# Patient Record
Sex: MALE | Race: WHITE | Employment: FULL TIME | URBAN - METROPOLITAN AREA
[De-identification: names, ages, dates, MRNs, and addresses within clinical notes are randomized per-mention and may not be internally consistent; named-entity substitution may affect disease eponyms.]

---

## 2024-08-16 ENCOUNTER — APPOINTMENT (OUTPATIENT)
Dept: CT IMAGING | Age: 34
End: 2024-08-16
Payer: COMMERCIAL

## 2024-08-16 ENCOUNTER — HOSPITAL ENCOUNTER (EMERGENCY)
Age: 34
Discharge: HOME | End: 2024-08-16
Attending: STUDENT IN AN ORGANIZED HEALTH CARE EDUCATION/TRAINING PROGRAM
Payer: COMMERCIAL

## 2024-08-16 VITALS
TEMPERATURE: 98.5 F | WEIGHT: 215.39 LBS | RESPIRATION RATE: 18 BRPM | HEART RATE: 60 BPM | DIASTOLIC BLOOD PRESSURE: 79 MMHG | SYSTOLIC BLOOD PRESSURE: 133 MMHG | OXYGEN SATURATION: 95 %

## 2024-08-16 DIAGNOSIS — L03.213 PERIORBITAL CELLULITIS OF LEFT EYE: Primary | ICD-10-CM

## 2024-08-16 LAB
ALBUMIN SERPL-MCNC: 4.2 G/DL (ref 3.4–5)
ANION GAP SERPL CALCULATED.3IONS-SCNC: 13 MMOL/L (ref 3–16)
BASOPHILS # BLD: 0.1 K/UL (ref 0–0.2)
BASOPHILS NFR BLD: 0.6 %
BUN SERPL-MCNC: 4 MG/DL (ref 7–20)
CALCIUM SERPL-MCNC: 9.2 MG/DL (ref 8.3–10.6)
CHLORIDE SERPL-SCNC: 99 MMOL/L (ref 99–110)
CO2 SERPL-SCNC: 25 MMOL/L (ref 21–32)
CREAT SERPL-MCNC: 0.7 MG/DL (ref 0.9–1.3)
DEPRECATED RDW RBC AUTO: 14.9 % (ref 12.4–15.4)
EOSINOPHIL # BLD: 0.1 K/UL (ref 0–0.6)
EOSINOPHIL NFR BLD: 0.9 %
GFR SERPLBLD CREATININE-BSD FMLA CKD-EPI: >90 ML/MIN/{1.73_M2}
GLUCOSE SERPL-MCNC: 118 MG/DL (ref 70–99)
HCT VFR BLD AUTO: 40.7 % (ref 40.5–52.5)
HGB BLD-MCNC: 14 G/DL (ref 13.5–17.5)
LYMPHOCYTES # BLD: 4 K/UL (ref 1–5.1)
LYMPHOCYTES NFR BLD: 41.4 %
MCH RBC QN AUTO: 31.8 PG (ref 26–34)
MCHC RBC AUTO-ENTMCNC: 34.5 G/DL (ref 31–36)
MCV RBC AUTO: 92.3 FL (ref 80–100)
MONOCYTES # BLD: 0.5 K/UL (ref 0–1.3)
MONOCYTES NFR BLD: 5.7 %
NEUTROPHILS # BLD: 5 K/UL (ref 1.7–7.7)
NEUTROPHILS NFR BLD: 51.4 %
PHOSPHATE SERPL-MCNC: 3.2 MG/DL (ref 2.5–4.9)
PLATELET # BLD AUTO: 205 K/UL (ref 135–450)
PMV BLD AUTO: 9.1 FL (ref 5–10.5)
POTASSIUM SERPL-SCNC: 3.4 MMOL/L (ref 3.5–5.1)
RBC # BLD AUTO: 4.41 M/UL (ref 4.2–5.9)
SODIUM SERPL-SCNC: 137 MMOL/L (ref 136–145)
WBC # BLD AUTO: 9.7 K/UL (ref 4–11)

## 2024-08-16 PROCEDURE — 6360000002 HC RX W HCPCS: Performed by: STUDENT IN AN ORGANIZED HEALTH CARE EDUCATION/TRAINING PROGRAM

## 2024-08-16 PROCEDURE — 6360000004 HC RX CONTRAST MEDICATION: Performed by: STUDENT IN AN ORGANIZED HEALTH CARE EDUCATION/TRAINING PROGRAM

## 2024-08-16 PROCEDURE — 2580000003 HC RX 258: Performed by: STUDENT IN AN ORGANIZED HEALTH CARE EDUCATION/TRAINING PROGRAM

## 2024-08-16 PROCEDURE — 96366 THER/PROPH/DIAG IV INF ADDON: CPT

## 2024-08-16 PROCEDURE — 85025 COMPLETE CBC W/AUTO DIFF WBC: CPT

## 2024-08-16 PROCEDURE — 96365 THER/PROPH/DIAG IV INF INIT: CPT

## 2024-08-16 PROCEDURE — 80069 RENAL FUNCTION PANEL: CPT

## 2024-08-16 PROCEDURE — 70481 CT ORBIT/EAR/FOSSA W/DYE: CPT

## 2024-08-16 PROCEDURE — 99285 EMERGENCY DEPT VISIT HI MDM: CPT

## 2024-08-16 PROCEDURE — 6370000000 HC RX 637 (ALT 250 FOR IP): Performed by: STUDENT IN AN ORGANIZED HEALTH CARE EDUCATION/TRAINING PROGRAM

## 2024-08-16 RX ORDER — IBUPROFEN 600 MG/1
600 TABLET ORAL EVERY 6 HOURS PRN
Qty: 30 TABLET | Refills: 0 | Status: SHIPPED | OUTPATIENT
Start: 2024-08-16 | End: 2024-08-16

## 2024-08-16 RX ORDER — OXYCODONE HYDROCHLORIDE 5 MG/1
5 TABLET ORAL ONCE
Status: COMPLETED | OUTPATIENT
Start: 2024-08-16 | End: 2024-08-16

## 2024-08-16 RX ORDER — TETRACAINE HYDROCHLORIDE 5 MG/ML
1 SOLUTION OPHTHALMIC ONCE
Status: COMPLETED | OUTPATIENT
Start: 2024-08-16 | End: 2024-08-16

## 2024-08-16 RX ORDER — AMOXICILLIN AND CLAVULANATE POTASSIUM 875; 125 MG/1; MG/1
1 TABLET, FILM COATED ORAL 2 TIMES DAILY
Qty: 14 TABLET | Refills: 0 | Status: SHIPPED | OUTPATIENT
Start: 2024-08-16 | End: 2024-08-16

## 2024-08-16 RX ORDER — IBUPROFEN 600 MG/1
600 TABLET ORAL EVERY 6 HOURS PRN
Qty: 30 TABLET | Refills: 0 | Status: SHIPPED | OUTPATIENT
Start: 2024-08-16

## 2024-08-16 RX ORDER — POLYVINYL ALCOHOL 14 MG/ML
1 SOLUTION/ DROPS OPHTHALMIC PRN
Qty: 15 ML | Refills: 4 | Status: SHIPPED | OUTPATIENT
Start: 2024-08-16 | End: 2024-09-15

## 2024-08-16 RX ORDER — AMOXICILLIN AND CLAVULANATE POTASSIUM 875; 125 MG/1; MG/1
1 TABLET, FILM COATED ORAL 2 TIMES DAILY
Qty: 14 TABLET | Refills: 0 | Status: SHIPPED | OUTPATIENT
Start: 2024-08-16 | End: 2024-08-23

## 2024-08-16 RX ADMIN — OXYCODONE HYDROCHLORIDE 5 MG: 5 TABLET ORAL at 15:05

## 2024-08-16 RX ADMIN — AMPICILLIN AND SULBACTAM 3000 MG: 2; 1 INJECTION, POWDER, FOR SOLUTION INTRAVENOUS at 14:43

## 2024-08-16 RX ADMIN — IOPAMIDOL 75 ML: 755 INJECTION, SOLUTION INTRAVENOUS at 16:24

## 2024-08-16 RX ADMIN — TETRACAINE HYDROCHLORIDE 1 DROP: 5 SOLUTION OPHTHALMIC at 14:43

## 2024-08-16 RX ADMIN — FLUORESCEIN SODIUM 1 MG: 1 STRIP OPHTHALMIC at 14:45

## 2024-08-16 ASSESSMENT — PAIN DESCRIPTION - ONSET: ONSET: GRADUAL

## 2024-08-16 ASSESSMENT — VISUAL ACUITY
OS: 20/100
OU: 20/50
OD: 20/50

## 2024-08-16 ASSESSMENT — PAIN SCALES - GENERAL
PAINLEVEL_OUTOF10: 9
PAINLEVEL_OUTOF10: 7

## 2024-08-16 ASSESSMENT — PAIN DESCRIPTION - DESCRIPTORS: DESCRIPTORS: SHARP

## 2024-08-16 ASSESSMENT — PAIN DESCRIPTION - ORIENTATION
ORIENTATION: LEFT
ORIENTATION: LEFT

## 2024-08-16 ASSESSMENT — PAIN - FUNCTIONAL ASSESSMENT
PAIN_FUNCTIONAL_ASSESSMENT: 0-10
PAIN_FUNCTIONAL_ASSESSMENT: PREVENTS OR INTERFERES WITH ALL ACTIVE AND SOME PASSIVE ACTIVITIES

## 2024-08-16 ASSESSMENT — PAIN DESCRIPTION - FREQUENCY: FREQUENCY: CONTINUOUS

## 2024-08-16 ASSESSMENT — PAIN DESCRIPTION - LOCATION
LOCATION: EYE
LOCATION: EYE

## 2024-08-16 ASSESSMENT — PAIN DESCRIPTION - PAIN TYPE: TYPE: ACUTE PAIN

## 2024-08-16 NOTE — DISCHARGE INSTRUCTIONS
Return to emergency department for worsening symptoms or other concerns, change in vision, worsening pain fevers, spreading of redness, or any other problems.  Take antibiotics as prescribed.  Take ibuprofen as scheduled for pain, you can also add some Tylenol up to 2 extra strength tablets at a time which should help with your pain as well.  Use warm compresses over the eye to help soothe the.

## 2024-08-16 NOTE — ED PROVIDER NOTES
THE [facility]  EMERGENCY DEPARTMENT ENCOUNTER          ATTENDING PHYSICIAN NOTE       Date of evaluation: 8/16/2024    Chief Complaint     Eye Pain (Left eye swelling. Unsure why. Pt think he may have gotten dirt or chemicals in it from work. )      History of Present Illness     Tacho Weller is a 33 y.o. male who presents with 2 days of left eye pain.  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  He is experiencing worse pain now that is currently 5/10, constant, dull but is worse with lateral gaze out of his left eye.  Denies any change to his vision although he has some trouble because his eyelids are swollen and he cannot open them fully.  Denies any associated headache, fever, recent illness.  No trauma to the eye.  He does not work with metal machining or woodworking and did not have any projectiles near his eye.    ASSESSMENT / PLAN  (MEDICAL DECISION MAKING)     INITIAL VITALS: BP: 133/79, Temp: 98.5 °F (36.9 °C), Pulse: 60, Respirations: 18, SpO2: 95 %      Tacho Weller is a 33 y.o. male who presents with left eye pain.Given patient age, lack of fixed and dilated pupil, no severe orbital or retro-orbital pain/headache this is unlikely to represent acute glaucoma.  Lack of photophobia, no pain with pupillary constriction, or ciliary flush makes iritis less likely.  Foreign body sensation was concerning for corneal abrasion, but no areas of fluorescein uptake and no corneal bodies shown on eversion of the lids and further evaluation of the canthi.  He does not have an intraocular pressure that is elevated.  He does not have evidence of penetrating injury.  Exam is most consistent with preseptal cellulitis but pain with lateral gaze is concerning for possible cellulitis.  He was given a dose of Unasyn here.  At this time he is being signed out to   family history is not on file.  He     Medications     Previous Medications    No medications on file       Allergies     He has No Known Allergies.    Physical Exam     INITIAL VITALS: BP: 133/79, Temp: 98.5 °F (36.9 °C), Pulse: 60, Respirations: 18, SpO2: 95 %     General: Uncomfortable appearing but nontoxic    HEENT:  Normocephalic, atraumatic. There is erythema and warmth about the left orbit without purulence. The left upper and lower eyelids are swollen, but the eye can be opened.     Eyes:   Ophthalmology:   -There is injection of the left conjunctivae without injection of the right conjunctiva  -No foreign bodies present on exam or eversion of the eyelids  -Visual acuity OD 20/50, OS 20/100  -PERRL, no photosensitivity  -EOMI with pain to lateral gaze of the left eye  -Visual fields Intact  -IOP: 12 bilaterally  -Woods lamp with fluorescein: No areas of uptake   -Slit lamp exam: No cell or flare seen, no areas of acute fluorescein uptake, no intraocular foreign body visualized, no corneal abrasions, no hyphema     Neck:  Supple, full ROM    Pulmonary:   CTA bl, no wheezes, rales, rhonchi    Cardiac:  Regular rate and rhythm, no m/r/g,     Abdomen:  Soft, nondistended, nontender    Extremities:  Warm, 2+ radial pulses    Skin: No rashes or bruises    Neuro:   Awake, alert, moving UE and LE spontaneously    Musculoskeletal: denies pain, recent fracture     Psych:   Mood and affect appropriate        Riley Trivedi MD  08/16/24 [id number]

## 2024-08-16 NOTE — ED PROVIDER NOTES
08/16/2024 05:20:01 PM  Condition at Disposition: Good        Diagnostic Results and Other Data                                   RADIOLOGY:  CT ORBITS W CONTRAST   Final Result      Left preseptal cellulitis. No evidence of post septal extension.      Electronically signed by Manuel Dotson MD          LABS:   Results for orders placed or performed during the hospital encounter of 08/16/24   CBC with Auto Differential   Result Value Ref Range    WBC 9.7 4.0 - 11.0 K/uL    RBC 4.41 4.20 - 5.90 M/uL    Hemoglobin 14.0 13.5 - 17.5 g/dL    Hematocrit 40.7 40.5 - 52.5 %    MCV 92.3 80.0 - 100.0 fL    MCH 31.8 26.0 - 34.0 pg    MCHC 34.5 31.0 - 36.0 g/dL    RDW 14.9 12.4 - 15.4 %    Platelets 205 135 - 450 K/uL    MPV 9.1 5.0 - 10.5 fL    Neutrophils % 51.4 %    Lymphocytes % 41.4 %    Monocytes % 5.7 %    Eosinophils % 0.9 %    Basophils % 0.6 %    Neutrophils Absolute 5.0 1.7 - 7.7 K/uL    Lymphocytes Absolute 4.0 1.0 - 5.1 K/uL    Monocytes Absolute 0.5 0.0 - 1.3 K/uL    Eosinophils Absolute 0.1 0.0 - 0.6 K/uL    Basophils Absolute 0.1 0.0 - 0.2 K/uL   Renal Function Panel   Result Value Ref Range    Sodium 137 136 - 145 mmol/L    Potassium 3.4 (L) 3.5 - 5.1 mmol/L    Chloride 99 99 - 110 mmol/L    CO2 25 21 - 32 mmol/L    Anion Gap 13 3 - 16    Glucose 118 (H) 70 - 99 mg/dL    BUN 4 (L) 7 - 20 mg/dL    Creatinine 0.7 (L) 0.9 - 1.3 mg/dL    Est, Glom Filt Rate >90 >60    Calcium 9.2 8.3 - 10.6 mg/dL    Phosphorus 3.2 2.5 - 4.9 mg/dL    Albumin 4.2 3.4 - 5.0 g/dL     EKG     MOST RECENT VITALS:  BP: 133/79, Temp: 98.5 °F (36.9 °C), Pulse: 60, Respirations: 18, SpO2: 95 %     Procedures         ED Course          The patient was given the following medications:  Orders Placed This Encounter   Medications    tetracaine (TETRAVISC) 0.5 % ophthalmic solution 1 drop    fluorescein ophthalmic strip 1 mg    ampicillin-sulbactam (UNASYN) 3,000 mg in sodium chloride 0.9 % 100 mL IVPB (mini-bag)     Order Specific Question:    Antimicrobial Indications     Answer:   Skin and Soft Tissue Infection    oxyCODONE (ROXICODONE) immediate release tablet 5 mg    iopamidol (ISOVUE-370) 76 % injection 75 mL    DISCONTD: ibuprofen (ADVIL;MOTRIN) 600 MG tablet     Sig: Take 1 tablet by mouth every 6 hours as needed for Pain     Dispense:  30 tablet     Refill:  0    DISCONTD: amoxicillin-clavulanate (AUGMENTIN) 875-125 MG per tablet     Sig: Take 1 tablet by mouth 2 times daily for 7 days     Dispense:  14 tablet     Refill:  0    amoxicillin-clavulanate (AUGMENTIN) 875-125 MG per tablet     Sig: Take 1 tablet by mouth 2 times daily for 7 days     Dispense:  14 tablet     Refill:  0    ibuprofen (ADVIL;MOTRIN) 600 MG tablet     Sig: Take 1 tablet by mouth every 6 hours as needed for Pain     Dispense:  30 tablet     Refill:  0    polyvinyl alcohol (LIQUIFILM TEARS) 1.4 % ophthalmic solution     Sig: Place 1 drop into the left eye as needed for Dry Eyes     Dispense:  15 mL     Refill:  4       CONSULTS:  None       Gabriel Hanks MD  08/16/24 [id number]